# Patient Record
Sex: FEMALE | Race: WHITE | NOT HISPANIC OR LATINO | URBAN - METROPOLITAN AREA
[De-identification: names, ages, dates, MRNs, and addresses within clinical notes are randomized per-mention and may not be internally consistent; named-entity substitution may affect disease eponyms.]

---

## 2017-01-19 ENCOUNTER — EMERGENCY (EMERGENCY)
Facility: HOSPITAL | Age: 52
LOS: 1 days | Discharge: HOME | End: 2017-01-19
Admitting: EMERGENCY MEDICINE
Payer: SELF-PAY

## 2017-01-19 VITALS
DIASTOLIC BLOOD PRESSURE: 78 MMHG | OXYGEN SATURATION: 100 % | SYSTOLIC BLOOD PRESSURE: 127 MMHG | TEMPERATURE: 99 F | RESPIRATION RATE: 16 BRPM | HEART RATE: 73 BPM

## 2017-01-19 PROCEDURE — 90792 PSYCH DIAG EVAL W/MED SRVCS: CPT

## 2017-01-19 PROCEDURE — 99284 EMERGENCY DEPT VISIT MOD MDM: CPT

## 2017-01-19 NOTE — ED PROVIDER NOTE - OBJECTIVE STATEMENT
The patient is a 51y Female hx of depression presents to ED requesting assistance with .  Pt denies all medical complaints.  Denies recent injuries, trauma or falls, no headache, back or neck pain, no nausea or vomiting, no fever or chills, no cp or sob, no palpitation or diaphoresis.  Denies etoh or illicit drugs, no SI/HI, no AVH.

## 2017-01-19 NOTE — ED BEHAVIORAL HEALTH ASSESSMENT NOTE - RISK ASSESSMENT
acute risks include financial difficulties. chronic risks include conflict with , unemployment, limited social support. protective factors include no depressive episode, no suicidal ideation, no homicidal ideation, no suicide attempt, no access to a gun, no substance abuse, no global insomnia, no panic/anxiety. patient does not appear to be at elevated risk of harm at this time.

## 2017-01-19 NOTE — ED ADULT NURSE REASSESSMENT NOTE - NS ED NURSE REASSESS COMMENT FT1
17:45-Patient in improved and stable condition, discharged as per MD Bentley order, discharge instructions given, pt verbalized understanding and left ER a&ox3 with metro card.

## 2017-01-19 NOTE — ED BEHAVIORAL HEALTH ASSESSMENT NOTE - HPI (INCLUDE ILLNESS QUALITY, SEVERITY, DURATION, TIMING, CONTEXT, MODIFYING FACTORS, ASSOCIATED SIGNS AND SYMPTOMS)
52yo /, childless, unemployed (former ), domiciled  F, with no prior psychiatric history, no meds, no suicide attempts, no substance abuse, no violence/legal issues, no PMH presents with EMS activated by a friend who is reportedly a  for possible depression. 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   Today patient states her friend called EMS so the patient can get help with ''. She states she has not worked in a 'few months' but is vague regarding what happened with her job. She states she is concerned about lack of income and anticipates some financial stress if she does not find work, and was hoping the ED could help with that. She reports her mood as 'upset' but denies pervasive sadness or anhedonia. Repotrs good sleep and appetite. Denies concentration problems or fatigue. Denies suicidal ideation, homicidal ideation, prior suicide attempts. Denies all manic and psychotic symptoms. Denies anxiety. She states she is currently living alone in [place] as she and her  , but they talk frequently and plan to reconcile. She declines to provide the name of her  friend who called EMS today and states she does not know their number. She provides her  Phillip as her collateral contact. Denies any legal issues or access to a weapon. 52yo /, childless, unemployed (former ), domiciled  F, with no prior psychiatric history, no meds, no suicide attempts, no substance abuse, no violence/legal issues, no PMH presents with EMS activated by a friend who is reportedly a  for possible depression. 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   Today patient states her friend called EMS so the patient can get help with ''. She states she has not worked in a 'few months' but is vague regarding what happened with her job. She states she is concerned about lack of income and anticipates some financial stress if she does not find work, and was hoping the ED could help with that. She reports her mood as 'upset' but denies pervasive sadness or anhedonia. Repotrs good sleep and appetite. Denies concentration problems or fatigue. Denies suicidal ideation, homicidal ideation, prior suicide attempts. Denies all manic and psychotic symptoms. Denies anxiety. She states she is currently living alone in [place] as she and her  , but they talk frequently and plan to reconcile. She declines to provide the name of her  friend who called EMS today and states she does not know their number. She provides her  Phillip as her collateral contact. Denies any legal issues or access to a weapon.    See  note for information from  - he had no safety concerns, spoke with her this AM.

## 2017-01-19 NOTE — ED BEHAVIORAL HEALTH ASSESSMENT NOTE - SUMMARY
52yo /, childless, unemployed (former ), domiciled  F, with no prior psychiatric history, no meds, no suicide attempts, no substance abuse, no violence/legal issues, no PMH presents with EMS activated by a friend who is reportedly a  for possible depression.   Patient is vague regarding circumstances that led her  friend calling EMS and does not provide any contact information. She does not meet criteria for a major depressive episode, denies all manic and psychotic symptoms, denies anxiety, denies SI/HI/SA, is calm and appropriate though somewhat vague.

## 2017-01-19 NOTE — ED BEHAVIORAL HEALTH NOTE - BEHAVIORAL HEALTH NOTE
Writer called pt's  Skyler  to obtain collateral. Writer left a voicemail requesting a callback to social work [facility].

## 2017-01-19 NOTE — ED PROVIDER NOTE - CHPI ED SYMPTOMS NEG
no suicidal/no paranoia/no homicidal/no change in level of consciousness/no weight loss/no confusion/no weakness/no agitation/no hallucinations

## 2017-01-19 NOTE — ED BEHAVIORAL HEALTH ASSESSMENT NOTE - REFERRAL / APPOINTMENT DETAILS
patient given information on how to obtain outpatient mental health services. also met with social work to review  that may be available to her

## 2017-01-19 NOTE — ED PROVIDER NOTE - MEDICAL DECISION MAKING DETAILS
51y Female hx of depression presents to ED requesting assistance with .  Pt is well appearing w/o medical complaints.  Will dispo as per psych-

## 2017-04-27 ENCOUNTER — INPATIENT (INPATIENT)
Facility: HOSPITAL | Age: 52
LOS: 3 days | Discharge: HOME | End: 2017-05-01
Attending: PSYCHIATRY & NEUROLOGY | Admitting: PSYCHIATRY & NEUROLOGY
Payer: MEDICAID

## 2017-04-27 VITALS
HEART RATE: 98 BPM | SYSTOLIC BLOOD PRESSURE: 100 MMHG | RESPIRATION RATE: 16 BRPM | DIASTOLIC BLOOD PRESSURE: 60 MMHG | OXYGEN SATURATION: 100 %

## 2017-04-27 DIAGNOSIS — F29 UNSPECIFIED PSYCHOSIS NOT DUE TO A SUBSTANCE OR KNOWN PHYSIOLOGICAL CONDITION: ICD-10-CM

## 2017-04-27 LAB
ALBUMIN SERPL ELPH-MCNC: 4.8 G/DL — SIGNIFICANT CHANGE UP (ref 3.3–5)
ALP SERPL-CCNC: 84 U/L — SIGNIFICANT CHANGE UP (ref 40–120)
ALT FLD-CCNC: 37 U/L — HIGH (ref 4–33)
AMPHET UR-MCNC: NEGATIVE — SIGNIFICANT CHANGE UP
APAP SERPL-MCNC: < 15 UG/ML — LOW (ref 15–25)
APPEARANCE UR: SIGNIFICANT CHANGE UP
AST SERPL-CCNC: 34 U/L — HIGH (ref 4–32)
BARBITURATES MEASUREMENT: NEGATIVE — SIGNIFICANT CHANGE UP
BARBITURATES UR SCN-MCNC: NEGATIVE — SIGNIFICANT CHANGE UP
BASOPHILS # BLD AUTO: 0.06 K/UL — SIGNIFICANT CHANGE UP (ref 0–0.2)
BASOPHILS NFR BLD AUTO: 0.8 % — SIGNIFICANT CHANGE UP (ref 0–2)
BENZODIAZ SERPL-MCNC: NEGATIVE — SIGNIFICANT CHANGE UP
BENZODIAZ UR-MCNC: NEGATIVE — SIGNIFICANT CHANGE UP
BILIRUB SERPL-MCNC: 0.4 MG/DL — SIGNIFICANT CHANGE UP (ref 0.2–1.2)
BILIRUB UR-MCNC: NEGATIVE — SIGNIFICANT CHANGE UP
BLOOD UR QL VISUAL: NEGATIVE — SIGNIFICANT CHANGE UP
BUN SERPL-MCNC: 11 MG/DL — SIGNIFICANT CHANGE UP (ref 7–23)
CALCIUM SERPL-MCNC: 9.7 MG/DL — SIGNIFICANT CHANGE UP (ref 8.4–10.5)
CANNABINOIDS UR-MCNC: NEGATIVE — SIGNIFICANT CHANGE UP
CHLORIDE SERPL-SCNC: 103 MMOL/L — SIGNIFICANT CHANGE UP (ref 98–107)
CO2 SERPL-SCNC: 21 MMOL/L — LOW (ref 22–31)
COCAINE METAB.OTHER UR-MCNC: NEGATIVE — SIGNIFICANT CHANGE UP
COLOR SPEC: SIGNIFICANT CHANGE UP
CREAT SERPL-MCNC: 0.66 MG/DL — SIGNIFICANT CHANGE UP (ref 0.5–1.3)
EOSINOPHIL # BLD AUTO: 0.05 K/UL — SIGNIFICANT CHANGE UP (ref 0–0.5)
EOSINOPHIL NFR BLD AUTO: 0.7 % — SIGNIFICANT CHANGE UP (ref 0–6)
ETHANOL BLD-MCNC: < 10 MG/DL — SIGNIFICANT CHANGE UP
GLUCOSE SERPL-MCNC: 92 MG/DL — SIGNIFICANT CHANGE UP (ref 70–99)
GLUCOSE UR-MCNC: NEGATIVE — SIGNIFICANT CHANGE UP
HCT VFR BLD CALC: 34.9 % — SIGNIFICANT CHANGE UP (ref 34.5–45)
HGB BLD-MCNC: 11 G/DL — LOW (ref 11.5–15.5)
IMM GRANULOCYTES NFR BLD AUTO: 0.3 % — SIGNIFICANT CHANGE UP (ref 0–1.5)
KETONES UR-MCNC: NEGATIVE — SIGNIFICANT CHANGE UP
LEUKOCYTE ESTERASE UR-ACNC: HIGH
LYMPHOCYTES # BLD AUTO: 2.27 K/UL — SIGNIFICANT CHANGE UP (ref 1–3.3)
LYMPHOCYTES # BLD AUTO: 31.5 % — SIGNIFICANT CHANGE UP (ref 13–44)
MCHC RBC-ENTMCNC: 24.2 PG — LOW (ref 27–34)
MCHC RBC-ENTMCNC: 31.5 % — LOW (ref 32–36)
MCV RBC AUTO: 76.9 FL — LOW (ref 80–100)
METHADONE UR-MCNC: NEGATIVE — SIGNIFICANT CHANGE UP
MONOCYTES # BLD AUTO: 0.52 K/UL — SIGNIFICANT CHANGE UP (ref 0–0.9)
MONOCYTES NFR BLD AUTO: 7.2 % — SIGNIFICANT CHANGE UP (ref 2–14)
MUCOUS THREADS # UR AUTO: SIGNIFICANT CHANGE UP
NEUTROPHILS # BLD AUTO: 4.28 K/UL — SIGNIFICANT CHANGE UP (ref 1.8–7.4)
NEUTROPHILS NFR BLD AUTO: 59.5 % — SIGNIFICANT CHANGE UP (ref 43–77)
NITRITE UR-MCNC: NEGATIVE — SIGNIFICANT CHANGE UP
NON-SQ EPI CELLS # UR AUTO: <1 — SIGNIFICANT CHANGE UP
OPIATES UR-MCNC: NEGATIVE — SIGNIFICANT CHANGE UP
OXYCODONE UR-MCNC: NEGATIVE — SIGNIFICANT CHANGE UP
PCP UR-MCNC: NEGATIVE — SIGNIFICANT CHANGE UP
PH UR: 6 — SIGNIFICANT CHANGE UP (ref 4.6–8)
PLATELET # BLD AUTO: 248 K/UL — SIGNIFICANT CHANGE UP (ref 150–400)
PMV BLD: 11.9 FL — SIGNIFICANT CHANGE UP (ref 7–13)
POTASSIUM SERPL-MCNC: 4.3 MMOL/L — SIGNIFICANT CHANGE UP (ref 3.5–5.3)
POTASSIUM SERPL-SCNC: 4.3 MMOL/L — SIGNIFICANT CHANGE UP (ref 3.5–5.3)
PROT SERPL-MCNC: 8.1 G/DL — SIGNIFICANT CHANGE UP (ref 6–8.3)
PROT UR-MCNC: NEGATIVE — SIGNIFICANT CHANGE UP
RBC # BLD: 4.54 M/UL — SIGNIFICANT CHANGE UP (ref 3.8–5.2)
RBC # FLD: 17.6 % — HIGH (ref 10.3–14.5)
RBC CASTS # UR COMP ASSIST: SIGNIFICANT CHANGE UP (ref 0–?)
SALICYLATES SERPL-MCNC: < 5 MG/DL — LOW (ref 15–30)
SODIUM SERPL-SCNC: 141 MMOL/L — SIGNIFICANT CHANGE UP (ref 135–145)
SP GR SPEC: 1.01 — SIGNIFICANT CHANGE UP (ref 1–1.03)
SQUAMOUS # UR AUTO: SIGNIFICANT CHANGE UP
TSH SERPL-MCNC: 2.15 UIU/ML — SIGNIFICANT CHANGE UP (ref 0.27–4.2)
UROBILINOGEN FLD QL: NORMAL E.U. — SIGNIFICANT CHANGE UP (ref 0.1–0.2)
WBC # BLD: 7.2 K/UL — SIGNIFICANT CHANGE UP (ref 3.8–10.5)
WBC # FLD AUTO: 7.2 K/UL — SIGNIFICANT CHANGE UP (ref 3.8–10.5)
WBC UR QL: SIGNIFICANT CHANGE UP (ref 0–?)

## 2017-04-27 RX ORDER — ARIPIPRAZOLE 15 MG/1
10 TABLET ORAL AT BEDTIME
Qty: 0 | Refills: 0 | Status: DISCONTINUED | OUTPATIENT
Start: 2017-04-27 | End: 2017-05-01

## 2017-04-27 RX ORDER — HALOPERIDOL DECANOATE 100 MG/ML
5 INJECTION INTRAMUSCULAR EVERY 6 HOURS
Qty: 0 | Refills: 0 | Status: DISCONTINUED | OUTPATIENT
Start: 2017-04-27 | End: 2017-05-01

## 2017-04-27 RX ORDER — HALOPERIDOL DECANOATE 100 MG/ML
5 INJECTION INTRAMUSCULAR ONCE
Qty: 0 | Refills: 0 | Status: DISCONTINUED | OUTPATIENT
Start: 2017-04-27 | End: 2017-05-01

## 2017-04-27 NOTE — ED BEHAVIORAL HEALTH NOTE - BEHAVIORAL HEALTH NOTE
Pt requested SW call  for collateral information. SW called pt's , Skyler , for collateral information.  stated he and pt reside separately, though he speaks to pt daily.  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   stated that pt has been struggling financially, which is difficult for pt emotionally.  stated that he believes pt is "doing okay," though noted he does not have additional information.  stated pt has a friend who is an [facility] officer and stated that the friend reportedly told pt he was concerned about pt's wellbeing, though  stated he is unsure why. Per , pt stated friend requested pt be seen by a Psychiatrist.  reported he has no additional information to share.     SW called the [address] [facility] [phone], and spoke to Officer Eco, as the precinct activated EMS. Officer Eco stated that pt had reportedly entered [employer] unauthorized, subsequently entered a classroom, and attempted to begin teaching when a student was concerned and activated 911. 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 Officer stated that due to pt's status as an EDP, care was transferred from Police to EMS. Officer stated no charges were pending at present and all students remained safe during ordeal.

## 2017-04-27 NOTE — ED BEHAVIORAL HEALTH ASSESSMENT NOTE - RISK ASSESSMENT
acute risks include financial difficulties. chronic risks include conflict with , unemployment, limited social support. protective factors include no depressive episode, no suicidal ideation, no homicidal ideation, no suicide attempt, no access to a gun, no substance abuse, no global insomnia, no panic/anxiety. patient does not appear to be at elevated risk of harm at this time. Risk factors include psychosis, impaired judgement and insight, impulsivity, delusions & inability to engage in safety planning.

## 2017-04-27 NOTE — ED BEHAVIORAL HEALTH ASSESSMENT NOTE - SUMMARY
50yo /, childless, unemployed (former ), domiciled  F, with no prior psychiatric history, no meds, no suicide attempts, no substance abuse, no violence/legal issues, no PMH presents with EMS activated by a friend who is reportedly a  for possible depression.   Patient is vague regarding circumstances that led her  friend calling EMS and does not provide any contact information. She does not meet criteria for a major depressive episode, denies all manic and psychotic symptoms, denies anxiety, denies SI/HI/SA, is calm and appropriate though somewhat vague. 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 Per police, patient was trespassing at a high school and behaving erratically & a concerned student called 911 for help. In ED, patient is disorganized, thought disordered and with rapidly shifting reports of events earlier today. In ED, patient appears paranoid, withdrawn and cannot participate in safety planning. Given pt's concerning presentation in ED & events of earlier today, cannot safely discharge patient. She will benefit from an inpatient psychiatric hospitalization for safety and stabilization.

## 2017-04-27 NOTE — ED ADULT TRIAGE NOTE - CHIEF COMPLAINT QUOTE
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 pt denies SI/HI ideation.

## 2017-04-27 NOTE — ED BEHAVIORAL HEALTH ASSESSMENT NOTE - CASE SUMMARY
52F with unknown psych hx (seen in the ED twice for depression under strange circumstances) presents with EMS after 911 was called. EMS stated that patient was calling 911 many times regarding paying rent at her apartment. 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 Patient is vague and thought disordered on exam, she cannot explain the events of what happened today and asks to call the police to have them prove what happened. She denies what others say happened today. Her  lives out of state and they are  and although he expressed no concerns he admits to not knowing much about how she is doing recently. Patient is paranoid and suspicious, delusions are suspected, and she exhibits extremely poor insight and judgment leading her to enter a school and interact with children when she is not an employee. She cannot participate in safety planning as she does not admit to any behaviors observed by police today. Patient therefore cannot adequately care for herself and is putting minors at risk with her behaviors which are believed to be due to underlying psychosis. Admit to low 3, 939, no constant obs needed in locked supervised setting. delusional disorder vs psychosis vs psychotic depression on differential. I agree with plan as above. security transport.

## 2017-04-27 NOTE — ED BEHAVIORAL HEALTH ASSESSMENT NOTE - PSYCHIATRIC ISSUES AND PLAN (INCLUDE STANDING AND PRN MEDICATION)
Start Abilify 10mg q HS for psychosis. Haldol 2.5mg/Ativan 2mg IM PRN for agitation, or p.o. q 6 hours PRN for agitation

## 2017-04-27 NOTE — ED ADULT NURSE REASSESSMENT NOTE - NS ED NURSE REASSESS COMMENT FT1
pt remains guarded, calm and cooperative. report called in. awaiting EMS transport to [facility]. Will continue to monitor.

## 2017-04-27 NOTE — ED PROVIDER NOTE - MEDICAL DECISION MAKING DETAILS
This is a 52 year old Female No PMHx BIBA for psych eval. Patient reports speaking to a police office regarding the eviction from her home and the police suggest she come here to speak to someone about her housing situation. 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 Seen in 2017 requesting  Seen in 2016 for calling police and making false claims. Patient has not medical complaints and denies medical history. Medical evaluation performed. There is no clinical evidence of intoxication or any acute medical problem requiring immediate intervention. Final disposition will be determined by psychiatrist. This is a 52 year old Female No PMHx BIBA for psych eval. Patient reports speaking to a police office regarding the eviction from her home and the police suggest she come here to speak to someone about her housing situation. 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 Seen in 2017 requesting  Seen in 2016 for calling police and making false claims. Patient has not medical complaints and denies medical history. Medical evaluation performed. There is no clinical evidence of intoxication or any acute medical problem requiring immediate intervention. Final disposition will be determined by psychiatrist. Patient denies symptoms of UTI Rajan fever NVD  Urine culture ordered

## 2017-04-27 NOTE — ED BEHAVIORAL HEALTH ASSESSMENT NOTE - MEDICAL ISSUES AND PLAN (INCLUDE STANDING AND PRN MEDICATION)
none, patient has no medical issues and was medically cleared by EM provider for psychiatric hospitalization

## 2017-04-27 NOTE — ED BEHAVIORAL HEALTH ASSESSMENT NOTE - SUICIDE RISK FACTORS
None Known Access to means (pills, firearms, etc.)/Unable to engage in safety planning/Highly impulsive behavior/Agitation/severe anxiety

## 2017-04-27 NOTE — ED BEHAVIORAL HEALTH ASSESSMENT NOTE - HPI (INCLUDE ILLNESS QUALITY, SEVERITY, DURATION, TIMING, CONTEXT, MODIFYING FACTORS, ASSOCIATED SIGNS AND SYMPTOMS)
53 yo /, childless, unemployed? (per past records, former ), domiciled  female, with no prior psychiatric history, but with 2 prior ED visits & seen & cleared by psychiatry, no history of medication trials, no suicide attempts, no substance abuse, no violence/legal issues, no PMH reported, BIB EMS after patient allegedly called 911 excessively and per EMS patient was told she could either go to the hospital for evaluation or be arrested.    In ED, patient is pleasant, appearing in good hygiene, 51 yo /, childless, unemployed? (per past records, former ), domiciled  female, with no prior psychiatric history, but with 2 prior ED visits & seen & cleared by psychiatry, no history of medication trials, no suicide attempts, no substance abuse, no violence/legal issues, no PMH reported, BIB EMS after a student at [employer] called 911 reporting patient was behaving erratically at the high school. Per police, patient was trespassing on property, was not making sense & they were concerned about her safety & requested EMS transport to ED for psychiatric evaluation.    As per collateral obtained by ,  stated "he and pt reside separately, though he speaks to pt daily.  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   stated that pt has been struggling financially, which is difficult for pt emotionally.  stated that he believes pt is "doing okay," though noted he does not have additional information.  stated pt has a friend who is an [facility] officer and stated that the friend reportedly told pt he was concerned about pt's wellbeing, though  stated he is unsure why. Per , pt stated friend requested pt be seen by a Psychiatrist.  reported he has no additional information to share. SW called the [address] [employer] [facility] [phone], and spoke to Officer Eco, as the precinct activated EMS. Officer Eco stated that pt had reportedly entered [employer] unauthorized, subsequently entered a classroom, and attempted to begin teaching when a student was concerned and activated 911. 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 Officer stated that due to pt's status as an EDP, care was transferred from Police to EMS. Officer stated no charges were pending at present and all students remained safe during ordeal."    In ED, patient presents as childlike & is oddly related. She is vague, disorganized at times, impoverished thought content & has difficulty explaining what brought her to the ED today. She initially reports that she is here because her friend, a , was concerned about her & told her to go to the ER. She reports she is not sure why he is concerned, but states she has been "worried" about being locked out of her apartment as her rent is late. She states she has always had the money to pay the rent, but states she didn't pay it yet despite having money orders as she felt something bad would happen. She is not able or not willing to explain what she thought would occur, repeatedly stating, "I already told you". 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 She states that she has to work again this coming Monday at [employer]. She admits she has been calling 911 a lot recently but states she isn't sure why she has been calling, "I don't remember".  Later, after being confronted with collateral indicating police were called to [employer] after patient trespassed there, patient denies ever saying she was a [employer] employee & denies being in the school today. Rather, she is stating she was at "[facility] [employer]" and was dropping off paperwork for a job.

## 2017-04-27 NOTE — ED BEHAVIORAL HEALTH ASSESSMENT NOTE - DESCRIPTION
calm, cooperative but somewhat vague. denies lives with  oddly related, keeping to self, lives with , but he is currently living in [place] and caring for his father

## 2017-04-28 PROCEDURE — 99223 1ST HOSP IP/OBS HIGH 75: CPT

## 2017-04-28 RX ORDER — ARIPIPRAZOLE 15 MG/1
10 TABLET ORAL AT BEDTIME
Qty: 0 | Refills: 0 | Status: DISCONTINUED | OUTPATIENT
Start: 2017-04-28 | End: 2017-04-28

## 2017-04-29 LAB
BACTERIA UR CULT: SIGNIFICANT CHANGE UP
SPECIMEN SOURCE: SIGNIFICANT CHANGE UP

## 2017-04-29 PROCEDURE — 99232 SBSQ HOSP IP/OBS MODERATE 35: CPT

## 2017-04-30 PROCEDURE — 99232 SBSQ HOSP IP/OBS MODERATE 35: CPT

## 2017-05-01 VITALS
RESPIRATION RATE: 17 BRPM | SYSTOLIC BLOOD PRESSURE: 111 MMHG | HEART RATE: 83 BPM | DIASTOLIC BLOOD PRESSURE: 73 MMHG | TEMPERATURE: 98 F

## 2017-05-01 PROCEDURE — 99238 HOSP IP/OBS DSCHRG MGMT 30/<: CPT

## 2018-04-18 ENCOUNTER — EMERGENCY (EMERGENCY)
Facility: HOSPITAL | Age: 53
LOS: 1 days | Discharge: HOME | End: 2018-04-18
Admitting: EMERGENCY MEDICINE
Payer: MEDICAID

## 2018-04-18 VITALS
SYSTOLIC BLOOD PRESSURE: 146 MMHG | OXYGEN SATURATION: 100 % | DIASTOLIC BLOOD PRESSURE: 85 MMHG | RESPIRATION RATE: 17 BRPM | HEART RATE: 110 BPM | TEMPERATURE: 99 F

## 2018-04-18 DIAGNOSIS — F29 UNSPECIFIED PSYCHOSIS NOT DUE TO A SUBSTANCE OR KNOWN PHYSIOLOGICAL CONDITION: ICD-10-CM

## 2018-04-18 LAB
ALBUMIN SERPL ELPH-MCNC: 4.7 G/DL — SIGNIFICANT CHANGE UP (ref 3.3–5)
ALP SERPL-CCNC: 102 U/L — SIGNIFICANT CHANGE UP (ref 40–120)
ALT FLD-CCNC: 56 U/L — HIGH (ref 4–33)
AMPHET UR-MCNC: NEGATIVE — SIGNIFICANT CHANGE UP
APAP SERPL-MCNC: < 15 UG/ML — LOW (ref 15–25)
APPEARANCE UR: CLEAR — SIGNIFICANT CHANGE UP
AST SERPL-CCNC: 34 U/L — HIGH (ref 4–32)
BACTERIA # UR AUTO: SIGNIFICANT CHANGE UP
BARBITURATES UR SCN-MCNC: NEGATIVE — SIGNIFICANT CHANGE UP
BASOPHILS # BLD AUTO: 0.06 K/UL — SIGNIFICANT CHANGE UP (ref 0–0.2)
BASOPHILS NFR BLD AUTO: 0.9 % — SIGNIFICANT CHANGE UP (ref 0–2)
BENZODIAZ UR-MCNC: NEGATIVE — SIGNIFICANT CHANGE UP
BILIRUB SERPL-MCNC: 0.3 MG/DL — SIGNIFICANT CHANGE UP (ref 0.2–1.2)
BILIRUB UR-MCNC: NEGATIVE — SIGNIFICANT CHANGE UP
BLOOD UR QL VISUAL: NEGATIVE — SIGNIFICANT CHANGE UP
BUN SERPL-MCNC: 16 MG/DL — SIGNIFICANT CHANGE UP (ref 7–23)
CALCIUM SERPL-MCNC: 9.8 MG/DL — SIGNIFICANT CHANGE UP (ref 8.4–10.5)
CANNABINOIDS UR-MCNC: NEGATIVE — SIGNIFICANT CHANGE UP
CHLORIDE SERPL-SCNC: 101 MMOL/L — SIGNIFICANT CHANGE UP (ref 98–107)
CO2 SERPL-SCNC: 24 MMOL/L — SIGNIFICANT CHANGE UP (ref 22–31)
COCAINE METAB.OTHER UR-MCNC: NEGATIVE — SIGNIFICANT CHANGE UP
COLOR SPEC: SIGNIFICANT CHANGE UP
CREAT SERPL-MCNC: 0.75 MG/DL — SIGNIFICANT CHANGE UP (ref 0.5–1.3)
EOSINOPHIL # BLD AUTO: 0.06 K/UL — SIGNIFICANT CHANGE UP (ref 0–0.5)
EOSINOPHIL NFR BLD AUTO: 0.9 % — SIGNIFICANT CHANGE UP (ref 0–6)
ETHANOL BLD-MCNC: < 10 MG/DL — SIGNIFICANT CHANGE UP
GLUCOSE SERPL-MCNC: 94 MG/DL — SIGNIFICANT CHANGE UP (ref 70–99)
GLUCOSE UR-MCNC: NEGATIVE — SIGNIFICANT CHANGE UP
HCT VFR BLD CALC: 36.2 % — SIGNIFICANT CHANGE UP (ref 34.5–45)
HGB BLD-MCNC: 11.1 G/DL — LOW (ref 11.5–15.5)
IMM GRANULOCYTES # BLD AUTO: 0.01 # — SIGNIFICANT CHANGE UP
IMM GRANULOCYTES NFR BLD AUTO: 0.2 % — SIGNIFICANT CHANGE UP (ref 0–1.5)
KETONES UR-MCNC: NEGATIVE — SIGNIFICANT CHANGE UP
LEUKOCYTE ESTERASE UR-ACNC: HIGH
LYMPHOCYTES # BLD AUTO: 1.4 K/UL — SIGNIFICANT CHANGE UP (ref 1–3.3)
LYMPHOCYTES # BLD AUTO: 21.9 % — SIGNIFICANT CHANGE UP (ref 13–44)
MCHC RBC-ENTMCNC: 24.2 PG — LOW (ref 27–34)
MCHC RBC-ENTMCNC: 30.7 % — LOW (ref 32–36)
MCV RBC AUTO: 78.9 FL — LOW (ref 80–100)
METHADONE UR-MCNC: NEGATIVE — SIGNIFICANT CHANGE UP
MONOCYTES # BLD AUTO: 0.46 K/UL — SIGNIFICANT CHANGE UP (ref 0–0.9)
MONOCYTES NFR BLD AUTO: 7.2 % — SIGNIFICANT CHANGE UP (ref 2–14)
MUCOUS THREADS # UR AUTO: SIGNIFICANT CHANGE UP
NEUTROPHILS # BLD AUTO: 4.41 K/UL — SIGNIFICANT CHANGE UP (ref 1.8–7.4)
NEUTROPHILS NFR BLD AUTO: 68.9 % — SIGNIFICANT CHANGE UP (ref 43–77)
NITRITE UR-MCNC: NEGATIVE — SIGNIFICANT CHANGE UP
NON-SQ EPI CELLS # UR AUTO: <1 — SIGNIFICANT CHANGE UP
NRBC # FLD: 0 — SIGNIFICANT CHANGE UP
OPIATES UR-MCNC: NEGATIVE — SIGNIFICANT CHANGE UP
OXYCODONE UR-MCNC: NEGATIVE — SIGNIFICANT CHANGE UP
PCP UR-MCNC: NEGATIVE — SIGNIFICANT CHANGE UP
PH UR: 5.5 — SIGNIFICANT CHANGE UP (ref 4.6–8)
PLATELET # BLD AUTO: 277 K/UL — SIGNIFICANT CHANGE UP (ref 150–400)
PMV BLD: 12.4 FL — SIGNIFICANT CHANGE UP (ref 7–13)
POTASSIUM SERPL-MCNC: 4.8 MMOL/L — SIGNIFICANT CHANGE UP (ref 3.5–5.3)
POTASSIUM SERPL-SCNC: 4.8 MMOL/L — SIGNIFICANT CHANGE UP (ref 3.5–5.3)
PROT SERPL-MCNC: 7.8 G/DL — SIGNIFICANT CHANGE UP (ref 6–8.3)
PROT UR-MCNC: 30 MG/DL — HIGH
RBC # BLD: 4.59 M/UL — SIGNIFICANT CHANGE UP (ref 3.8–5.2)
RBC # FLD: 16.4 % — HIGH (ref 10.3–14.5)
SALICYLATES SERPL-MCNC: < 5 MG/DL — LOW (ref 15–30)
SODIUM SERPL-SCNC: 140 MMOL/L — SIGNIFICANT CHANGE UP (ref 135–145)
SP GR SPEC: 1.02 — SIGNIFICANT CHANGE UP (ref 1–1.04)
SQUAMOUS # UR AUTO: SIGNIFICANT CHANGE UP
TSH SERPL-MCNC: 1.93 UIU/ML — SIGNIFICANT CHANGE UP (ref 0.27–4.2)
UROBILINOGEN FLD QL: NORMAL MG/DL — SIGNIFICANT CHANGE UP
WBC # BLD: 6.4 K/UL — SIGNIFICANT CHANGE UP (ref 3.8–10.5)
WBC # FLD AUTO: 6.4 K/UL — SIGNIFICANT CHANGE UP (ref 3.8–10.5)
WBC UR QL: SIGNIFICANT CHANGE UP (ref 0–?)

## 2018-04-18 PROCEDURE — 90792 PSYCH DIAG EVAL W/MED SRVCS: CPT | Mod: GC

## 2018-04-18 PROCEDURE — 99284 EMERGENCY DEPT VISIT MOD MDM: CPT

## 2018-04-18 NOTE — ED PROVIDER NOTE - OBJECTIVE STATEMENT
This is a 53 year old Female PMHX Schizoaffective  BIBIA for psych eval r/t manic behavior. Per Ems patient was running in and out of traffic, when caught by PD she became catatonic for 2-3 minutes then returned to baseline. Patient reported to the EMS that she was being harassed by the police. One past psych admission Denies past suicide attempts. Denies SI/HI Denies AH/VH Denies ETOH/Illicit drugs Patient reports " I was just trying to find my car" patient is calm and cooperative

## 2018-04-18 NOTE — ED BEHAVIORAL HEALTH ASSESSMENT NOTE - DESCRIPTION
Patient arrived in ED and was triaged to . Calm and cooperative. Denies. Still having regular periods. NKDA. 53 F, ,  and living alone in co-op in [place] ( lives in [place]), no dependents, unemployed past 2 years (prior  at [employer])

## 2018-04-18 NOTE — ED BEHAVIORAL HEALTH ASSESSMENT NOTE - OTHER
[place] [address]  from  who lives in [place] mother's passing, separation from , loss of job possible delusions but none prominent or spontaneous possible hallucinations but again none elicited and patient not overtly responding to internal stimuli here oddly dressed with green eyeliner and dark lipstick in style atypical for age. oddly related

## 2018-04-18 NOTE — ED BEHAVIORAL HEALTH ASSESSMENT NOTE - SUMMARY
53 F, ,  and living alone in co-op in [place] ( lives in [place]), no dependents, unemployed past 2 years (prior  at [employer]), PPH of psychosis, 1 past admission to [place] [address] in 4/2017 for 5 days, no past SA or SIB, no past aggression, no past outpatient psych tx, no past substance or legal trouble, who presents via EMS activated by officers of [facility] precinct per triage note for reportedly "grabbing breasts" and wandering streets into traffic. On interview here and from collateral from [facility] and patient's sister (who saw her last night and is taking her home personally today) no evidence of acute psychosis (delusions, hallucinations, formal thought disorder) was elicited, although patient is somewhat oddly related. No evidence of acute lidia or depression. No trauma history. No acute safety concerns, suicidality, or homicidality elicited. 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

## 2018-04-18 NOTE — ED BEHAVIORAL HEALTH ASSESSMENT NOTE - OTHER PAST PSYCHIATRIC HISTORY (INCLUDE DETAILS REGARDING ONSET, COURSE OF ILLNESS, INPATIENT/OUTPATIENT TREATMENT)
Has 2 past ED visits in early 2017, admitted to [place] [address] for 5 days in 4/2017 and was recommended aripiprazole but never took it and then stabilized enough for discharge. No prior psych history, no past outpatient treatment or therapy. No past SA or SIB, no past aggression.

## 2018-04-18 NOTE — ED ADULT NURSE NOTE - OBJECTIVE STATEMENT
pt received in  c/o psych eval, pt calm and cooperative in , denies SI,HI&AH, denies ETOH and substance use. awaiting psych eval

## 2018-04-18 NOTE — ED ADULT TRIAGE NOTE - CHIEF COMPLAINT QUOTE
pt bib ems after finding pt running around the streets grabbing her breasts and trying to run into traffic. pt calm in triage.   NP called, pt to go directly back.

## 2018-04-18 NOTE — ED PROVIDER NOTE - MEDICAL DECISION MAKING DETAILS
This is a 53 year old Female PMHX Schizoaffective  BIBIA for psych eval r/t manic behavior. Per Ems patient was running in and out of traffic, when caught by PD she became catatonic for 2-3 minutes then returned to baseline Medical evaluation performed. There is no clinical evidence of intoxication or any acute medical problem requiring immediate intervention. Final disposition will be determined by psychiatrist.

## 2018-04-18 NOTE — ED BEHAVIORAL HEALTH ASSESSMENT NOTE - CASE SUMMARY
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  Pt denies any active mood/psychotic symptoms.  Is oddly related, no overt psychotic symptoms.  Calm and cooperative throughout ED stay.  No need for medications/restraints.  Collateral from sister who expresses no safety concerns.  Pt not interested in inpatient psychiatric hospitalization and does not meet criteria for involuntary hospitalization.  Wonder if patient has intermittent subsyndromal psychotic symptoms vs. ?schizotypal personality disorder, but no evidence of imminent dangerousness towards self/others.  Is interested in outpatient f/u, referral information provided, patient may f/u with Dr. Thompson at [facility].

## 2018-04-18 NOTE — ED BEHAVIORAL HEALTH ASSESSMENT NOTE - REFERRAL / APPOINTMENT DETAILS
Patient and sister provided number for [facility] Referral information for [facility] adult outpatient clinic appointment provided to patient and sister

## 2018-04-18 NOTE — ED BEHAVIORAL HEALTH ASSESSMENT NOTE - RISK ASSESSMENT
Modifiable risk factors: possible sub-syndromal psychotic disorder. Unmodifiable risk factors: 1 past psych admission, age >50, living alone, loss of parent, unemployed. Protective factors: future-oriented, no acute psychotic or mood symptoms, no current suicidality or homicidality, no past SA/SIB/aggression, family supports living close by and involved in patient's life. Given above, patient does not meet criteria for involuntary psych admission. She declined voluntary psych admission, but did accept referral to outpatient clinic.

## 2018-04-20 LAB
BACTERIA UR CULT: SIGNIFICANT CHANGE UP
SPECIMEN SOURCE: SIGNIFICANT CHANGE UP

## 2019-01-08 ENCOUNTER — EMERGENCY (EMERGENCY)
Facility: HOSPITAL | Age: 54
LOS: 1 days | Discharge: HOME | End: 2019-01-08
Admitting: EMERGENCY MEDICINE
Payer: MEDICAID

## 2019-01-08 VITALS
SYSTOLIC BLOOD PRESSURE: 142 MMHG | TEMPERATURE: 98 F | DIASTOLIC BLOOD PRESSURE: 78 MMHG | HEART RATE: 61 BPM | RESPIRATION RATE: 15 BRPM | OXYGEN SATURATION: 99 %

## 2019-01-08 PROCEDURE — 99283 EMERGENCY DEPT VISIT LOW MDM: CPT

## 2019-01-08 NOTE — ED BEHAVIORAL HEALTH NOTE - BEHAVIORAL HEALTH NOTE
Writer called [facility], [phone], and spoke with Kendy, to arrange livery service, as requested by the evaluating provider. Kendy provided invoice # [id number], for livery service, to be provided by [email or web address], [phone], to transport her to her residence, verified to be: [address] [place], [place] [id number], phone [phone]. Writer called [employer] and was given an ETA of 6PM.

## 2019-01-08 NOTE — ED PROVIDER NOTE - MEDICAL DECISION MAKING DETAILS
54 y/o female presents to ED A/O x 3 with no complaints.  Collateral obtained from patient's  who states he has no concern for patient's safety or the safety of others she is discharged.  Pt cleared for stable discharge and given [facility] information with discharge paperwork

## 2019-01-08 NOTE — ED PROVIDER NOTE - OBJECTIVE STATEMENT
52 y/o female presents to ED A/O x 3 with no complaints.  As per EMS, [employer] states "she was standing on the sidewalk just staring at them not saying anything with a blank stare".  [employer] was "concerned, so they called for a bus".  Pt with no c/o pain or discomfort, denies SOB, CP.  Denies SI/HI/AH/VH.  Pt calm, cooperative and smiling in [facility] intake area and states: "I'm fine".  "I am okay, I was just standing there, but I stay out there all the time".

## 2019-01-08 NOTE — ED ADULT NURSE REASSESSMENT NOTE - NS ED NURSE REASSESS COMMENT FT1
Pt calm & cooperative d/c by NP pt verbalizing understanding of d/c instructions pt currently denies Si/Hi/AVh resources provided to pt upon discharge.

## 2019-01-08 NOTE — ED ADULT NURSE NOTE - CHIEF COMPLAINT QUOTE
Pt brought in from [facility] for staring at police officers in their car, refusing to leave.  Pt answering minimal questions in triage, states does not take bipolar meds.

## 2019-01-08 NOTE — ED ADULT NURSE NOTE - OBJECTIVE STATEMENT
Pt calm & cooperative bought in for by EMS s/p staring blankly at [facility] officers pt currently denies Si/Hi/AVh safety & comfort measures maintained eval on going.

## 2019-01-08 NOTE — ED ADULT TRIAGE NOTE - CHIEF COMPLAINT QUOTE
Pt brought in from [facility] for staring at police officers in their car, refusing to leave.  Pt answering minimal questions in triage, states does not take bipolar meds.

## 2019-01-09 PROBLEM — F32.9 MAJOR DEPRESSIVE DISORDER, SINGLE EPISODE, UNSPECIFIED: Chronic | Status: ACTIVE | Noted: 2017-01-19

## 2019-01-09 PROBLEM — F25.9 SCHIZOAFFECTIVE DISORDER, UNSPECIFIED: Chronic | Status: ACTIVE | Noted: 2018-04-18

## 2019-07-16 NOTE — ED PROVIDER NOTE - DISPOSITION TYPE
DISCHARGE Sski Pregnancy And Lactation Text: This medication is Pregnancy Category D and isn't considered safe during pregnancy. It is excreted in breast milk.

## 2020-04-08 NOTE — ED PROVIDER NOTE - ATTESTATION, MLM
Diet: dysphagia 1 honey thick  Dispo: DNR, comfort care ; no further labs I have reviewed and confirmed nurses' notes for patient's medications, allergies, medical history, and surgical history.

## 2022-10-28 NOTE — ED BEHAVIORAL HEALTH ASSESSMENT NOTE - PERSONAL COLLATERAL PHONE
[phone] Pt presents to ED for multiple medical complaints. Pt c/o upper abd pain, intermittent shortness of breath, and headache. Pt states that it is exacerbated after she eats. Denies nausea/vomiting, diarrhea, fever/chills. pt does not appear in respiratory distress, respirations even and unlabored. HR 80, 100% spo2 in triage.

## 2022-12-29 NOTE — ED PROVIDER NOTE - FAMILY HISTORY
Pt is calling back and stated that she still has yet not heard back from any in regards to the DME order, is there any other options we may have her.    Please let me know so I can reach back out to the PT.    Thank you,      Bertha Camacho    [facility]     No pertinent family history in first degree relatives

## 2023-03-06 NOTE — ED BEHAVIORAL HEALTH ASSESSMENT NOTE - DIFFERENTIAL
Problem: Adult Inpatient Plan of Care  Goal: Optimal Comfort and Wellbeing  Outcome: Ongoing, Progressing      adjustment disorder, r/o pdd spectrum per prior note r/o MDD with psychotic features, r/o primary psychotic disorder,
